# Patient Record
Sex: FEMALE | URBAN - METROPOLITAN AREA
[De-identification: names, ages, dates, MRNs, and addresses within clinical notes are randomized per-mention and may not be internally consistent; named-entity substitution may affect disease eponyms.]

---

## 2023-11-09 ENCOUNTER — TELEPHONE (OUTPATIENT)
Dept: PRIMARY CARE | Facility: CLINIC | Age: 83
End: 2023-11-09
Payer: COMMERCIAL

## 2023-11-09 NOTE — TELEPHONE ENCOUNTER
Pt was in [facility], last week treated her with ABX that caused yeast infection, asking if you could call something in for this?     Patient has follow up scheduled

## 2023-11-16 ENCOUNTER — OFFICE VISIT (OUTPATIENT)
Dept: PRIMARY CARE | Facility: CLINIC | Age: 83
End: 2023-11-16
Payer: COMMERCIAL

## 2023-11-16 VITALS
WEIGHT: 129 LBS | OXYGEN SATURATION: 98 % | RESPIRATION RATE: 16 BRPM | DIASTOLIC BLOOD PRESSURE: 76 MMHG | HEART RATE: 95 BPM | HEIGHT: 62 IN | TEMPERATURE: 97.8 F | BODY MASS INDEX: 23.74 KG/M2 | SYSTOLIC BLOOD PRESSURE: 120 MMHG

## 2023-11-16 DIAGNOSIS — B37.31 CANDIDA VAGINITIS: ICD-10-CM

## 2023-11-16 DIAGNOSIS — J45.20 INTERMITTENT ASTHMA, UNSPECIFIED ASTHMA SEVERITY, UNSPECIFIED WHETHER COMPLICATED (HHS-HCC): ICD-10-CM

## 2023-11-16 DIAGNOSIS — K57.32 DIVERTICULITIS OF LARGE INTESTINE WITHOUT PERFORATION OR ABSCESS WITHOUT BLEEDING: Primary | ICD-10-CM

## 2023-11-16 DIAGNOSIS — J30.9 ALLERGIC RHINITIS, UNSPECIFIED SEASONALITY, UNSPECIFIED TRIGGER: ICD-10-CM

## 2023-11-16 PROBLEM — I10 ESSENTIAL (PRIMARY) HYPERTENSION: Status: ACTIVE | Noted: 2023-11-16

## 2023-11-16 PROCEDURE — 99214 OFFICE O/P EST MOD 30 MIN: CPT | Performed by: INTERNAL MEDICINE

## 2023-11-16 PROCEDURE — 3078F DIAST BP <80 MM HG: CPT | Performed by: INTERNAL MEDICINE

## 2023-11-16 PROCEDURE — 1159F MED LIST DOCD IN RCRD: CPT | Performed by: INTERNAL MEDICINE

## 2023-11-16 PROCEDURE — 3074F SYST BP LT 130 MM HG: CPT | Performed by: INTERNAL MEDICINE

## 2023-11-16 RX ORDER — AZITHROMYCIN 250 MG/1
500 TABLET, FILM COATED ORAL DAILY
COMMUNITY
Start: 2019-02-10 | End: 2024-01-29 | Stop reason: WASHOUT

## 2023-11-16 RX ORDER — MONTELUKAST SODIUM 10 MG/1
10 TABLET ORAL NIGHTLY
Qty: 30 TABLET | Refills: 1 | Status: SHIPPED | OUTPATIENT
Start: 2023-11-16 | End: 2024-01-29 | Stop reason: SINTOL

## 2023-11-16 RX ORDER — AMOXICILLIN AND CLAVULANATE POTASSIUM 875; 125 MG/1; MG/1
875 TABLET, FILM COATED ORAL EVERY 12 HOURS
COMMUNITY
Start: 2023-10-28 | End: 2023-11-07

## 2023-11-16 RX ORDER — UREA 40 %
1 CREAM (GRAM) TOPICAL 2 TIMES DAILY
COMMUNITY
Start: 2012-01-06 | End: 2024-01-29 | Stop reason: WASHOUT

## 2023-11-16 RX ORDER — FLUCONAZOLE 200 MG/1
200 TABLET ORAL DAILY
Qty: 5 TABLET | Refills: 0 | Status: SHIPPED | OUTPATIENT
Start: 2023-11-16 | End: 2023-11-21

## 2023-11-16 RX ORDER — ALBUTEROL SULFATE 90 UG/1
2 AEROSOL, METERED RESPIRATORY (INHALATION) EVERY 6 HOURS PRN
COMMUNITY
Start: 2020-06-18 | End: 2023-11-16 | Stop reason: SDUPTHER

## 2023-11-16 RX ORDER — ALBUTEROL SULFATE 90 UG/1
2 AEROSOL, METERED RESPIRATORY (INHALATION) EVERY 8 HOURS PRN
Qty: 6 G | Refills: 0 | Status: SHIPPED | OUTPATIENT
Start: 2023-11-16 | End: 2024-06-03

## 2023-11-16 ASSESSMENT — ENCOUNTER SYMPTOMS
ABDOMINAL DISTENTION: 0
CONSTIPATION: 0
ABDOMINAL PAIN: 0
DIARRHEA: 0
NAUSEA: 0
VOMITING: 0

## 2023-11-16 NOTE — PROGRESS NOTES
Primary Care Physician: Migue Martínez MD    Date of Visit: 11/16/2023      Summary:   Sunday Orion is a 82 y.o. female presents     Chief Complaint:   Chief Complaint   Patient presents with    Follow-up     [facility] ED- Diverticulitis   Patient stated was given a high dose of antibiotics possibly Amoxicillin and it caused her to have a yeast infection     Ear Fullness     Patient requested Ear irrigation and prescription allergy relief          HPI:  Ear Fullness   Pertinent negatives include no abdominal pain, diarrhea or vomiting.     Hospital f/up  Pt states that she developed abd pain.  To the ed for eval.   Dx'd with diverticulitis.    Admitted x 5 days.  Ivabx.     No more pain.    No diarrhea, constipation, melena,n/v    Did develop a yeast infection post-abx    Note: she has never had a colonoscopy and she does not want one now.     Past Medical History:  No past medical history on file.     Social History:        Family History:  family history is not on file.      Allergies:  Allergies   Allergen Reactions    Loratadine-Pseudoephedrine Anaphylaxis    Macadamia Nut Oil Anaphylaxis    Pistachio Nut Anaphylaxis    Naproxen Sodium Hives and Unknown    Ibuprofen Rash       Outpatient Medications:  Current Outpatient Medications   Medication Instructions    albuterol 90 mcg/actuation inhaler 2 puffs, inhalation, Every 6 hours PRN    azithromycin (ZITHROMAX) 500 mg, oral, Daily    urea (Carmol) 40 % cream 1 Application, Topical, 2 times daily         ROS:   Review of Systems   Gastrointestinal:  Negative for abdominal distention, abdominal pain, constipation, diarrhea, nausea and vomiting.   Genitourinary:  Positive for vaginal discharge (and itching).   All other systems reviewed and are negative.       Vitals:    11/16/23 1046   BP: 120/76   BP Location: Left arm   Patient Position: Sitting   BP Cuff Size: Adult   Pulse: 95   Resp: 16   Temp: 36.6 °C (97.8 °F)   TempSrc: Temporal   SpO2: 98%   Weight: 58.5 kg  "(129 lb)   Height: 1.575 m (5' 2\")     Wt Readings from Last 1 Encounters:   11/16/23 58.5 kg (129 lb)     Body mass index is 23.59 kg/m².       Physical Exam:  Physical Exam  Constitutional:       General: She is not in acute distress.     Appearance: Normal appearance. She is normal weight. She is not ill-appearing.   HENT:      Head: Normocephalic and atraumatic.      Right Ear: Tympanic membrane and ear canal normal.      Left Ear: Tympanic membrane and ear canal normal.      Nose: Nose normal.      Mouth/Throat:      Mouth: Mucous membranes are moist.      Pharynx: Oropharynx is clear.   Cardiovascular:      Rate and Rhythm: Normal rate and regular rhythm.      Heart sounds: Normal heart sounds. No murmur heard.  Pulmonary:      Effort: Pulmonary effort is normal.      Breath sounds: Normal breath sounds.   Abdominal:      General: Abdomen is flat. Bowel sounds are normal. There is no distension.      Palpations: Abdomen is soft.      Tenderness: There is no abdominal tenderness. There is no guarding or rebound.   Neurological:      Mental Status: She is alert.               Assessment/Plan   Diagnoses and all orders for this visit:  Diverticulitis of large intestine without perforation or abscess without bleeding  Allergic rhinitis, unspecified seasonality, unspecified trigger  -     montelukast (Singulair) 10 mg tablet; Take 1 tablet (10 mg) by mouth once daily at bedtime.  Candida vaginitis  -     fluconazole (Diflucan) 200 mg tablet; Take 1 tablet (200 mg) by mouth once daily for 5 days.  Intermittant  asthma        -refilled albuterol    Orders:  No orders of the defined types were placed in this encounter.       Followup Appts:  No future appointments.        ____________________________________________________________  Migue Martínez MD  "

## 2024-01-18 ENCOUNTER — APPOINTMENT (OUTPATIENT)
Dept: PRIMARY CARE | Facility: CLINIC | Age: 84
End: 2024-01-18
Payer: COMMERCIAL

## 2024-01-29 ENCOUNTER — OFFICE VISIT (OUTPATIENT)
Dept: PRIMARY CARE | Facility: CLINIC | Age: 84
End: 2024-01-29
Payer: COMMERCIAL

## 2024-01-29 ENCOUNTER — APPOINTMENT (OUTPATIENT)
Dept: PRIMARY CARE | Facility: CLINIC | Age: 84
End: 2024-01-29
Payer: COMMERCIAL

## 2024-01-29 VITALS
SYSTOLIC BLOOD PRESSURE: 130 MMHG | HEART RATE: 90 BPM | DIASTOLIC BLOOD PRESSURE: 80 MMHG | OXYGEN SATURATION: 98 % | BODY MASS INDEX: 23.85 KG/M2 | WEIGHT: 130.4 LBS | TEMPERATURE: 97.8 F

## 2024-01-29 DIAGNOSIS — R05.9 COUGH, UNSPECIFIED TYPE: Primary | ICD-10-CM

## 2024-01-29 DIAGNOSIS — F41.9 ANXIETY: ICD-10-CM

## 2024-01-29 DIAGNOSIS — J40 BRONCHITIS: ICD-10-CM

## 2024-01-29 DIAGNOSIS — F43.10 PTSD (POST-TRAUMATIC STRESS DISORDER): ICD-10-CM

## 2024-01-29 PROCEDURE — 1159F MED LIST DOCD IN RCRD: CPT | Performed by: INTERNAL MEDICINE

## 2024-01-29 PROCEDURE — 99214 OFFICE O/P EST MOD 30 MIN: CPT | Performed by: INTERNAL MEDICINE

## 2024-01-29 PROCEDURE — 3075F SYST BP GE 130 - 139MM HG: CPT | Performed by: INTERNAL MEDICINE

## 2024-01-29 PROCEDURE — 3079F DIAST BP 80-89 MM HG: CPT | Performed by: INTERNAL MEDICINE

## 2024-01-29 RX ORDER — LEVOFLOXACIN 500 MG/1
500 TABLET, FILM COATED ORAL DAILY
Qty: 10 TABLET | Refills: 0 | Status: SHIPPED | OUTPATIENT
Start: 2024-01-29 | End: 2024-02-08

## 2024-01-29 RX ORDER — BUSPIRONE HYDROCHLORIDE 10 MG/1
10 TABLET ORAL 2 TIMES DAILY PRN
Qty: 60 TABLET | Refills: 1 | Status: SHIPPED | OUTPATIENT
Start: 2024-01-29 | End: 2024-04-29 | Stop reason: WASHOUT

## 2024-01-29 RX ORDER — BENZONATATE 200 MG/1
200 CAPSULE ORAL 3 TIMES DAILY PRN
Qty: 45 CAPSULE | Refills: 0 | Status: SHIPPED | OUTPATIENT
Start: 2024-01-29 | End: 2024-02-13

## 2024-01-29 RX ORDER — PREDNISONE 10 MG/1
TABLET ORAL
Qty: 42 TABLET | Refills: 0 | Status: SHIPPED | OUTPATIENT
Start: 2024-01-29 | End: 2024-02-05

## 2024-01-29 NOTE — PROGRESS NOTES
Primary Care Physician: Migue Martínez MD    Date of Visit: 01/29/2024     Chief Complaint:   Chief Complaint   Patient presents with    Bronchitis     1 week ER fu - last Monday.  Neg Covid & Pneumonia       Subjective:   Sunday Orion is a 83 y.o. female presents     HPI:  HPI  Cough for 2 weeks.  She has been to the ed.  Given medrol dose radha.  No abx.  Still coughing.  +mild sob when coughing.  +chest congestion  No fever.  No chills.  Covid test was neg.  Hx of  ptsd/anxiety.  States that she has been more anxious lately.      Past Medical History:  No past medical history on file.     Social History:   reports that she has been smoking cigarettes. She has never used smokeless tobacco.     Family History:  family history is not on file.      Allergies:  Allergies   Allergen Reactions    Loratadine-Pseudoephedrine Anaphylaxis    Macadamia Nut Oil Anaphylaxis    Pistachio Nut Anaphylaxis    Naproxen Sodium Hives and Unknown    Ibuprofen Rash       Outpatient Medications:  Current Outpatient Medications   Medication Instructions    albuterol 90 mcg/actuation inhaler 2 puffs, inhalation, Every 8 hours PRN         ROS:   Review of Systems   Constitutional:  Negative for chills and fever.   HENT:  Positive for congestion.    Respiratory:  Positive for cough and chest tightness.         Vitals:  /80   Pulse 90   Temp 36.6 °C (97.8 °F)   Wt 59.1 kg (130 lb 6.4 oz)   SpO2 98%   BMI 23.85 kg/m²   Wt Readings from Last 2 Encounters:   01/29/24 59.1 kg (130 lb 6.4 oz)   11/16/23 58.5 kg (129 lb)       Physical Exam:  Physical Exam  Constitutional:       Appearance: She is ill-appearing. She is not toxic-appearing.   Pulmonary:      Effort: No respiratory distress.      Breath sounds: No wheezing (but lungs sound tight.), rhonchi or rales.      Comments: Breath sounds are decreased throughout.         Assessment/Plan   Diagnoses and all orders for this visit:  Cough, unspecified type  -     benzonatate  (Tessalon) 200 mg capsule; Take 1 capsule (200 mg) by mouth 3 times a day as needed for cough for up to 15 days. Do not crush or chew.  Bronchitis  -     levoFLOXacin (Levaquin) 500 mg tablet; Take 1 tablet (500 mg) by mouth once daily for 10 days.  -     predniSONE (Deltasone) 10 mg tablet; Take 6 tablets (60 mg) by mouth once daily for 2 days, THEN 4 tablets (40 mg) once daily for 2 days, THEN 2 tablets (20 mg) once daily for 1 day, THEN 1 tablet (10 mg) once daily for 1 day, THEN 0.5 tablets (5 mg) once daily for 1 day.  -     benzonatate (Tessalon) 200 mg capsule; Take 1 capsule (200 mg) by mouth 3 times a day as needed for cough for up to 15 days. Do not crush or chew.  Anxiety  -     busPIRone (Buspar) 10 mg tablet; Take 1 tablet (10 mg) by mouth 2 times a day as needed (anxiety).  PTSD (post-traumatic stress disorder)  -     busPIRone (Buspar) 10 mg tablet; Take 1 tablet (10 mg) by mouth 2 times a day as needed (anxiety).        Orders:  No orders of the defined types were placed in this encounter.       Followup Appts:  No future appointments.        ____________________________________________________________  Migue Martínez MD

## 2024-02-19 ASSESSMENT — ENCOUNTER SYMPTOMS
FEVER: 0
COUGH: 1
CHEST TIGHTNESS: 1
CHILLS: 0

## 2024-04-11 ENCOUNTER — TELEPHONE (OUTPATIENT)
Dept: PRIMARY CARE | Facility: CLINIC | Age: 84
End: 2024-04-11
Payer: COMMERCIAL

## 2024-04-11 NOTE — TELEPHONE ENCOUNTER
PATIENT ASKING FOR A RETURN TO WORK LETTER. STATES SHE HAS BEEN OUT OF WORK SINCE SHE WAS HOSPITALIZED MARCH 30 AND WOULD LIKE TO GO BACK TO WORK NOW.  EMAIL: [email or web address]

## 2024-04-29 ENCOUNTER — OFFICE VISIT (OUTPATIENT)
Dept: PRIMARY CARE | Facility: CLINIC | Age: 84
End: 2024-04-29
Payer: COMMERCIAL

## 2024-04-29 VITALS
BODY MASS INDEX: 23.48 KG/M2 | HEIGHT: 62 IN | SYSTOLIC BLOOD PRESSURE: 139 MMHG | TEMPERATURE: 97.6 F | DIASTOLIC BLOOD PRESSURE: 81 MMHG | OXYGEN SATURATION: 100 % | WEIGHT: 127.6 LBS | HEART RATE: 76 BPM

## 2024-04-29 DIAGNOSIS — K57.32 DIVERTICULITIS OF LARGE INTESTINE WITHOUT PERFORATION OR ABSCESS WITHOUT BLEEDING: Primary | ICD-10-CM

## 2024-04-29 PROCEDURE — 3079F DIAST BP 80-89 MM HG: CPT | Performed by: INTERNAL MEDICINE

## 2024-04-29 PROCEDURE — 3075F SYST BP GE 130 - 139MM HG: CPT | Performed by: INTERNAL MEDICINE

## 2024-04-29 PROCEDURE — 1159F MED LIST DOCD IN RCRD: CPT | Performed by: INTERNAL MEDICINE

## 2024-04-29 PROCEDURE — 99213 OFFICE O/P EST LOW 20 MIN: CPT | Performed by: INTERNAL MEDICINE

## 2024-04-29 RX ORDER — OXYCODONE HYDROCHLORIDE 5 MG/1
5 TABLET ORAL EVERY 6 HOURS PRN
Qty: 20 TABLET | Refills: 0 | Status: SHIPPED | OUTPATIENT
Start: 2024-04-29 | End: 2024-06-03 | Stop reason: WASHOUT

## 2024-04-29 RX ORDER — OXYCODONE HYDROCHLORIDE 5 MG/1
5 TABLET ORAL EVERY 4 HOURS PRN
COMMUNITY
Start: 2024-04-04 | End: 2024-04-29 | Stop reason: SDUPTHER

## 2024-04-29 RX ORDER — CIPROFLOXACIN 500 MG/1
500 TABLET ORAL 2 TIMES DAILY
Qty: 20 TABLET | Refills: 0 | Status: SHIPPED | OUTPATIENT
Start: 2024-04-29 | End: 2024-06-03 | Stop reason: WASHOUT

## 2024-04-29 RX ORDER — METRONIDAZOLE 500 MG/1
500 TABLET ORAL 3 TIMES DAILY
Qty: 30 TABLET | Refills: 0 | Status: SHIPPED | OUTPATIENT
Start: 2024-04-29 | End: 2024-06-03 | Stop reason: WASHOUT

## 2024-04-29 NOTE — PROGRESS NOTES
"Primary Care Physician: Migue Martínez MD    Date of Visit: 04/29/2024     Chief Complaint:   Chief Complaint   Patient presents with    Diverticulitis     Follow up from ED admission       Subjective:   Nathanlila Sears is a 83 y.o. female presents     HPI:  HPI  Pt developed left lower abd pain.   To the ed for eval.   Dx'd with diverticulitis.    Admitted.  Ivf and pain control.    D/c'd to home after 5 day hospital stay.   Here for f/up  States that she is still having mild abd pain.  She says that she is ''afraid to eat'' she has been drinking plenty of fluids.  No fever or chills.   Dwp.  Start with soft diet such as a brat diet for 2-3days and gradually advance diet.   Past Medical History:  No past medical history on file.     Social History:   reports that she has been smoking cigarettes. She has never used smokeless tobacco. She reports that she does not drink alcohol and does not use drugs.     Family History:  family history is not on file.      Allergies:  Allergies   Allergen Reactions    Loratadine-Pseudoephedrine Anaphylaxis    Macadamia Nut Oil Anaphylaxis    Pistachio Nut Anaphylaxis    Naproxen Sodium Hives and Unknown    Ibuprofen Rash       Outpatient Medications:  Current Outpatient Medications   Medication Instructions    albuterol 90 mcg/actuation inhaler 2 puffs, inhalation, Every 8 hours PRN    busPIRone (BUSPAR) 10 mg, oral, 2 times daily PRN    oxyCODONE (ROXICODONE) 5 mg, oral, Every 4 hours PRN         ROS:   Review of Systems     Vitals:  /81 (BP Location: Left arm, Patient Position: Sitting, BP Cuff Size: Adult)   Pulse 76   Temp 36.4 °C (97.6 °F) (Temporal)   Ht 1.575 m (5' 2.01\")   Wt 57.9 kg (127 lb 9.6 oz)   SpO2 100%   BMI 23.33 kg/m²   Wt Readings from Last 2 Encounters:   04/29/24 57.9 kg (127 lb 9.6 oz)   01/29/24 59.1 kg (130 lb 6.4 oz)       Physical Exam:  Physical Exam  Vitals reviewed.   Constitutional:       Appearance: Normal appearance.      Comments: Looks " anxious and uncomfortable.  ''holding'' hands on either side of her abdomen.   HENT:      Head: Normocephalic and atraumatic.   Cardiovascular:      Rate and Rhythm: Normal rate and regular rhythm.      Heart sounds: Normal heart sounds, S1 normal and S2 normal. No murmur heard.  Pulmonary:      Effort: Pulmonary effort is normal.      Breath sounds: Normal breath sounds. No wheezing, rhonchi or rales.   Abdominal:      General: Bowel sounds are normal. There is no distension.      Palpations: Abdomen is soft. There is no mass.      Tenderness: There is abdominal tenderness. There is guarding.   Neurological:      Mental Status: She is alert and oriented to person, place, and time.          Assessment/Plan   Diagnoses and all orders for this visit:  Diverticulitis of large intestine without perforation or abscess without bleeding  -     ciprofloxacin (Cipro) 500 mg tablet; Take 1 tablet (500 mg) by mouth 2 times a day.  -     metroNIDAZOLE (Flagyl) 500 mg tablet; Take 1 tablet (500 mg) by mouth 3 times a day.  -     oxyCODONE (Roxicodone) 5 mg immediate release tablet; Take 1 tablet (5 mg) by mouth every 6 hours if needed for moderate pain (4 - 6).  Slowly advance diet.      Orders:  No orders of the defined types were placed in this encounter.       Followup Appts:  No future appointments.        ____________________________________________________________  Migue Martínez MD

## 2024-04-30 ENCOUNTER — TELEPHONE (OUTPATIENT)
Dept: PRIMARY CARE | Facility: CLINIC | Age: 84
End: 2024-04-30
Payer: COMMERCIAL

## 2024-04-30 NOTE — TELEPHONE ENCOUNTER
Patient states she gets prescription for ibuprofen from you and it was not sent into the pharmacy yesterday with her other meds.  Please send that in for her.

## 2024-05-09 ENCOUNTER — APPOINTMENT (OUTPATIENT)
Dept: PRIMARY CARE | Facility: CLINIC | Age: 84
End: 2024-05-09
Payer: COMMERCIAL

## 2024-05-20 ENCOUNTER — APPOINTMENT (OUTPATIENT)
Dept: PRIMARY CARE | Facility: CLINIC | Age: 84
End: 2024-05-20
Payer: COMMERCIAL

## 2024-06-03 ENCOUNTER — OFFICE VISIT (OUTPATIENT)
Dept: PRIMARY CARE | Facility: CLINIC | Age: 84
End: 2024-06-03
Payer: COMMERCIAL

## 2024-06-03 VITALS
HEART RATE: 79 BPM | RESPIRATION RATE: 18 BRPM | BODY MASS INDEX: 22.19 KG/M2 | SYSTOLIC BLOOD PRESSURE: 118 MMHG | DIASTOLIC BLOOD PRESSURE: 70 MMHG | HEIGHT: 62 IN | TEMPERATURE: 97.2 F | OXYGEN SATURATION: 98 % | WEIGHT: 120.6 LBS

## 2024-06-03 DIAGNOSIS — G56.03 BILATERAL CARPAL TUNNEL SYNDROME: ICD-10-CM

## 2024-06-03 DIAGNOSIS — J45.20 INTERMITTENT ASTHMA, UNSPECIFIED ASTHMA SEVERITY, UNSPECIFIED WHETHER COMPLICATED (HHS-HCC): ICD-10-CM

## 2024-06-03 DIAGNOSIS — G47.00 INSOMNIA, UNSPECIFIED TYPE: ICD-10-CM

## 2024-06-03 DIAGNOSIS — R63.4 WEIGHT LOSS: ICD-10-CM

## 2024-06-03 DIAGNOSIS — R19.7 DIARRHEA, UNSPECIFIED TYPE: Primary | ICD-10-CM

## 2024-06-03 DIAGNOSIS — J45.20 INTERMITTENT ASTHMA, UNSPECIFIED ASTHMA SEVERITY, UNSPECIFIED WHETHER COMPLICATED (HHS-HCC): Primary | ICD-10-CM

## 2024-06-03 DIAGNOSIS — R19.7 DIARRHEA, UNSPECIFIED TYPE: ICD-10-CM

## 2024-06-03 PROCEDURE — 99214 OFFICE O/P EST MOD 30 MIN: CPT | Performed by: INTERNAL MEDICINE

## 2024-06-03 PROCEDURE — 3078F DIAST BP <80 MM HG: CPT | Performed by: INTERNAL MEDICINE

## 2024-06-03 PROCEDURE — 3074F SYST BP LT 130 MM HG: CPT | Performed by: INTERNAL MEDICINE

## 2024-06-03 PROCEDURE — 1159F MED LIST DOCD IN RCRD: CPT | Performed by: INTERNAL MEDICINE

## 2024-06-03 RX ORDER — TRAZODONE HYDROCHLORIDE 50 MG/1
50 TABLET ORAL NIGHTLY PRN
Qty: 30 TABLET | Refills: 0 | Status: SHIPPED | OUTPATIENT
Start: 2024-06-03 | End: 2024-06-19 | Stop reason: SDUPTHER

## 2024-06-03 NOTE — PROGRESS NOTES
"Primary Care Physician: Migue Martínez MD    Date of Visit: 06/03/2024     Chief Complaint:   Chief Complaint   Patient presents with    Follow-up     Wants to gain weight       Subjective:   Sunday Orion is a 83 y.o. female presents     HPI:  HPI  Diarrhea.  Started with bout of diverticulitis.  Recovery has been slow.  No longer has abd pain. But, she still has intermittent diarrhea. No blood  +Weight loss post diverticulitis.    Trouble going to sleep at night     Carpal tunnel syndrome.  States that she has pain in bilat wrists.     Also says that she has had some urinary incontinence.       Past Medical History:  No past medical history on file.     Social History:   reports that she has been smoking cigarettes. She has never used smokeless tobacco. She reports that she does not drink alcohol and does not use drugs.     Family History:  family history is not on file.      Allergies:  Allergies   Allergen Reactions    Loratadine-Pseudoephedrine Anaphylaxis    Macadamia Nut Oil Anaphylaxis    Pistachio Nut Anaphylaxis    Naproxen Sodium Hives and Unknown    Ibuprofen Rash       Outpatient Medications:  Current Outpatient Medications   Medication Instructions    albuterol 90 mcg/actuation inhaler 2 puffs, inhalation, Every 8 hours PRN    traZODone (DESYREL) 50 mg, oral, Nightly PRN         ROS:   Review of Systems   Constitutional:         +wt loss with diarrhea        Vitals:  /70 (BP Location: Left arm, Patient Position: Sitting, BP Cuff Size: Adult)   Pulse 79   Temp 36.2 °C (97.2 °F) (Temporal)   Resp 18   Ht 1.575 m (5' 2.01\")   Wt 54.7 kg (120 lb 9.6 oz)   SpO2 98%   BMI 22.05 kg/m²   Wt Readings from Last 2 Encounters:   06/03/24 54.7 kg (120 lb 9.6 oz)   04/29/24 57.9 kg (127 lb 9.6 oz)       Physical Exam:  Physical Exam  Vitals reviewed.   Constitutional:       General: She is in acute distress (+mild anxiety).      Appearance: Normal appearance. She is not ill-appearing or " toxic-appearing.   HENT:      Head: Normocephalic and atraumatic.   Cardiovascular:      Rate and Rhythm: Normal rate and regular rhythm.      Heart sounds: Normal heart sounds, S1 normal and S2 normal. No murmur heard.  Pulmonary:      Effort: Pulmonary effort is normal.      Breath sounds: Normal breath sounds. No wheezing, rhonchi or rales.   Abdominal:      General: Bowel sounds are normal.      Palpations: Abdomen is soft.   Neurological:      Mental Status: She is alert and oriented to person, place, and time.   Psychiatric:         Behavior: Behavior is cooperative.               Assessment/Plan   Diagnoses and all orders for this visit:  Diarrhea, unspecified type  -     Stool Pathogen Panel, PCR  Weight loss  Insomnia, unspecified type  Bilateral carpal tunnel syndrome  -     Wrist brace cock up volar  Intermittent asthma, unspecified asthma severity, unspecified whether complicated ([facility])        Orders:  Orders Placed This Encounter   Procedures    Wrist brace cock up volar    Stool Pathogen Panel, PCR        Followup Appts:  No future appointments.        ____________________________________________________________  Migue Martínez MD

## 2024-06-07 ENCOUNTER — LAB (OUTPATIENT)
Dept: LAB | Facility: LAB | Age: 84
End: 2024-06-07
Payer: COMMERCIAL

## 2024-06-07 PROCEDURE — 87506 IADNA-DNA/RNA PROBE TQ 6-11: CPT

## 2024-06-08 LAB

## 2024-06-19 DIAGNOSIS — G47.00 INSOMNIA, UNSPECIFIED TYPE: ICD-10-CM

## 2024-06-20 DIAGNOSIS — G47.00 INSOMNIA, UNSPECIFIED TYPE: ICD-10-CM

## 2024-06-20 NOTE — TELEPHONE ENCOUNTER
Patient states insurance will only cover a 90 day supply of Trazodone they wont cover 30, can you resned for 90 day?

## 2024-06-24 RX ORDER — TRAZODONE HYDROCHLORIDE 50 MG/1
50 TABLET ORAL NIGHTLY PRN
Qty: 30 TABLET | Refills: 0 | Status: SHIPPED | OUTPATIENT
Start: 2024-06-24 | End: 2025-06-24

## 2024-06-24 RX ORDER — TRAZODONE HYDROCHLORIDE 50 MG/1
50 TABLET ORAL NIGHTLY PRN
Qty: 30 TABLET | Refills: 0 | Status: SHIPPED | OUTPATIENT
Start: 2024-06-24 | End: 2024-06-24

## 2024-06-26 NOTE — TELEPHONE ENCOUNTER
Has to be for 90 days please per patient insurance      -------------------------------------------------------------------------------------------------------------------  Pls inform pt that she will have to pay out of pocket.  It is a new medication.

## 2024-06-27 RX ORDER — TRAZODONE HYDROCHLORIDE 50 MG/1
50 TABLET ORAL NIGHTLY PRN
Qty: 90 TABLET | Refills: 0 | OUTPATIENT
Start: 2024-06-27 | End: 2025-06-27

## 2024-06-28 ENCOUNTER — TELEPHONE (OUTPATIENT)
Dept: PRIMARY CARE | Facility: CLINIC | Age: 84
End: 2024-06-28
Payer: COMMERCIAL

## 2024-06-28 PROBLEM — R19.7 DIARRHEA: Status: ACTIVE | Noted: 2024-06-28

## 2024-06-28 PROBLEM — G47.00 INSOMNIA: Status: ACTIVE | Noted: 2024-06-28

## 2024-06-28 PROBLEM — J45.20 INTERMITTENT ASTHMA (HHS-HCC): Status: ACTIVE | Noted: 2024-06-28

## 2024-06-28 PROBLEM — R63.4 WEIGHT LOSS: Status: ACTIVE | Noted: 2024-06-28

## 2024-06-28 PROBLEM — G56.03 BILATERAL CARPAL TUNNEL SYNDROME: Status: ACTIVE | Noted: 2024-06-28

## 2024-07-18 ENCOUNTER — APPOINTMENT (OUTPATIENT)
Dept: PRIMARY CARE | Facility: CLINIC | Age: 84
End: 2024-07-18
Payer: COMMERCIAL

## 2024-07-18 VITALS
BODY MASS INDEX: 21 KG/M2 | WEIGHT: 123 LBS | SYSTOLIC BLOOD PRESSURE: 126 MMHG | HEIGHT: 64 IN | HEART RATE: 73 BPM | OXYGEN SATURATION: 97 % | DIASTOLIC BLOOD PRESSURE: 68 MMHG

## 2024-07-18 DIAGNOSIS — R10.84 GENERALIZED ABDOMINAL PAIN: ICD-10-CM

## 2024-07-18 DIAGNOSIS — R73.9 HYPERGLYCEMIA: ICD-10-CM

## 2024-07-18 DIAGNOSIS — Z13.1 SCREENING FOR DIABETES MELLITUS: ICD-10-CM

## 2024-07-18 DIAGNOSIS — E55.9 VITAMIN D DEFICIENCY: ICD-10-CM

## 2024-07-18 DIAGNOSIS — D64.9 ANEMIA, UNSPECIFIED TYPE: ICD-10-CM

## 2024-07-18 DIAGNOSIS — Z13.29 SCREENING FOR THYROID DISORDER: ICD-10-CM

## 2024-07-18 DIAGNOSIS — G47.00 INSOMNIA, UNSPECIFIED TYPE: ICD-10-CM

## 2024-07-18 DIAGNOSIS — R63.4 WEIGHT LOSS: ICD-10-CM

## 2024-07-18 DIAGNOSIS — Z00.00 ENCOUNTER FOR MEDICARE ANNUAL WELLNESS EXAM: Primary | ICD-10-CM

## 2024-07-18 DIAGNOSIS — E78.2 MIXED HYPERLIPIDEMIA: ICD-10-CM

## 2024-07-18 PROCEDURE — 3074F SYST BP LT 130 MM HG: CPT | Performed by: INTERNAL MEDICINE

## 2024-07-18 PROCEDURE — 3078F DIAST BP <80 MM HG: CPT | Performed by: INTERNAL MEDICINE

## 2024-07-18 PROCEDURE — 1159F MED LIST DOCD IN RCRD: CPT | Performed by: INTERNAL MEDICINE

## 2024-07-18 PROCEDURE — 99214 OFFICE O/P EST MOD 30 MIN: CPT | Performed by: INTERNAL MEDICINE

## 2024-07-18 RX ORDER — TRAZODONE HYDROCHLORIDE 50 MG/1
50 TABLET ORAL NIGHTLY PRN
Qty: 90 TABLET | Refills: 1 | Status: SHIPPED | OUTPATIENT
Start: 2024-07-18 | End: 2025-07-18

## 2024-07-18 NOTE — PROGRESS NOTES
"Primary Care Physician: Migue Martínez MD    Date of Visit: 07/18/2024     Chief Complaint:   Chief Complaint   Patient presents with    Follow-up       Subjective:   Sunday Orion is a 83 y.o. female presents     HPI:  HPI  INSOMNIA.  Finally got script to try the trazodone.  States that it has helped her sleep.  STATES THAT SHE IS SOMETIMES GROGGY IN THE MORNING, BUT, SHE GOES TO BED LATE AND WAKES UP EARLY TO PROVIDE TRANSPORTATION TO SOMEONE.  DIVERIC--ABD PAIN HAS FINALLY RESOLVED COMPLETELY.  FOODS DO NO BOTHER HER WHEN SHE EATS.   WT LOSS POST DIVERTICULITIS  DOWN  POUNDS.  NOW GAINING WT BACK--     Past Medical History:  No past medical history on file.     Social History:   reports that she has been smoking cigarettes. She has never used smokeless tobacco. She reports that she does not drink alcohol and does not use drugs.     Family History:  family history is not on file.      Allergies:  Allergies   Allergen Reactions    Loratadine-Pseudoephedrine Anaphylaxis    Macadamia Nut Oil Anaphylaxis    Pistachio Nut Anaphylaxis    Naproxen Sodium Hives and Unknown    Ibuprofen Rash       Outpatient Medications:  Current Outpatient Medications   Medication Instructions    albuterol 90 mcg/actuation inhaler 2 puffs, inhalation, Every 8 hours PRN    traZODone (DESYREL) 50 mg, oral, Nightly PRN         ROS:   Review of Systems     Vitals:  /68 (BP Location: Left arm, Patient Position: Sitting, BP Cuff Size: Adult)   Pulse 73   Ht 1.626 m (5' 4\")   Wt 55.8 kg (123 lb)   SpO2 97%   BMI 21.11 kg/m²   Wt Readings from Last 2 Encounters:   07/18/24 55.8 kg (123 lb)   06/03/24 54.7 kg (120 lb 9.6 oz)       Physical Exam:  Physical Exam  Vitals reviewed.   Constitutional:       Appearance: Normal appearance.   HENT:      Head: Normocephalic and atraumatic.   Cardiovascular:      Rate and Rhythm: Normal rate and regular rhythm.      Heart sounds: Normal heart sounds, S1 normal and S2 normal. No murmur " heard.  Pulmonary:      Effort: Pulmonary effort is normal.      Breath sounds: Normal breath sounds. No wheezing, rhonchi or rales.   Abdominal:      General: Bowel sounds are normal.      Palpations: Abdomen is soft.   Neurological:      Mental Status: She is alert and oriented to person, place, and time.               Assessment/Plan   Diagnoses and all orders for this visit:  Encounter for Medicare annual wellness exam  -     CBC and Auto Differential; Future  -     Comprehensive Metabolic Panel; Future  -     Lipid Panel; Future  -     Urinalysis with Reflex Microscopic; Future  -     Vitamin D 25-Hydroxy,Total (for eval of Vitamin D levels); Future  -     TSH with reflex to Free T4 if abnormal; Future  -     Hemoglobin A1C; Future  Generalized abdominal pain  Weight loss  Anemia, unspecified type  -     CBC and Auto Differential; Future  Mixed hyperlipidemia  -     Lipid Panel; Future  Vitamin D deficiency  -     Vitamin D 25-Hydroxy,Total (for eval of Vitamin D levels); Future  Screening for thyroid disorder  -     TSH with reflex to Free T4 if abnormal; Future  Screening for diabetes mellitus  -     Hemoglobin A1C; Future  Hyperglycemia  -     Hemoglobin A1C; Future  Insomnia, unspecified type  -     traZODone (Desyrel) 50 mg tablet; Take 1 tablet (50 mg) by mouth as needed at bedtime for sleep.    Labs ordered, but she is not getting done today.     Orders:  Orders Placed This Encounter   Procedures    CBC and Auto Differential    Comprehensive Metabolic Panel    Lipid Panel    Urinalysis with Reflex Microscopic    Vitamin D 25-Hydroxy,Total (for eval of Vitamin D levels)    TSH with reflex to Free T4 if abnormal    Hemoglobin A1C        Followup Appts:  No future appointments.        ____________________________________________________________  Migue Martínez MD

## 2024-07-18 NOTE — PROGRESS NOTES
Primary Care Physician: Migue Martínez MD    Date of Visit: 07/18/2024     Chief Complaint:   No chief complaint on file.      Subjective:   Sunday Orion is a 83 y.o. female presents     HPI:  HPI    ANXIETY    Past Medical History:  No past medical history on file.     Social History:   reports that she has been smoking cigarettes. She has never used smokeless tobacco. She reports that she does not drink alcohol and does not use drugs.     Family History:  family history is not on file.      Allergies:  Allergies   Allergen Reactions   • Loratadine-Pseudoephedrine Anaphylaxis   • Macadamia Nut Oil Anaphylaxis   • Pistachio Nut Anaphylaxis   • Naproxen Sodium Hives and Unknown   • Ibuprofen Rash       Outpatient Medications:  Current Outpatient Medications   Medication Instructions   • albuterol 90 mcg/actuation inhaler 2 puffs, inhalation, Every 8 hours PRN   • traZODone (DESYREL) 50 mg, oral, Nightly PRN         ROS:   Review of Systems     Vitals:  There were no vitals taken for this visit.  Wt Readings from Last 2 Encounters:   07/18/24 55.8 kg (123 lb)   06/03/24 54.7 kg (120 lb 9.6 oz)       Physical Exam:  Physical Exam          Assessment/Plan         Orders:  No orders of the defined types were placed in this encounter.       Followup Appts:  No future appointments.        ____________________________________________________________  Migue Martínez MD

## 2024-07-31 ENCOUNTER — TELEPHONE (OUTPATIENT)
Dept: PRIMARY CARE | Facility: CLINIC | Age: 84
End: 2024-07-31
Payer: COMMERCIAL

## 2024-07-31 NOTE — TELEPHONE ENCOUNTER
Pt asking if you will send in prednisone 10mg for her. Having a rash. Says you have called in before.

## 2024-08-22 ENCOUNTER — OFFICE VISIT (OUTPATIENT)
Dept: PRIMARY CARE | Facility: CLINIC | Age: 84
End: 2024-08-22
Payer: COMMERCIAL

## 2024-08-22 ENCOUNTER — TELEPHONE (OUTPATIENT)
Dept: PRIMARY CARE | Facility: CLINIC | Age: 84
End: 2024-08-22
Payer: COMMERCIAL

## 2024-08-22 VITALS
SYSTOLIC BLOOD PRESSURE: 132 MMHG | BODY MASS INDEX: 20.32 KG/M2 | DIASTOLIC BLOOD PRESSURE: 78 MMHG | HEART RATE: 77 BPM | OXYGEN SATURATION: 98 % | HEIGHT: 64 IN | WEIGHT: 119 LBS

## 2024-08-22 VITALS
SYSTOLIC BLOOD PRESSURE: 132 MMHG | HEART RATE: 93 BPM | OXYGEN SATURATION: 100 % | WEIGHT: 119 LBS | HEIGHT: 64 IN | DIASTOLIC BLOOD PRESSURE: 78 MMHG | BODY MASS INDEX: 20.32 KG/M2

## 2024-08-22 DIAGNOSIS — F41.9 ANXIETY: ICD-10-CM

## 2024-08-22 DIAGNOSIS — K59.00 CONSTIPATION, UNSPECIFIED CONSTIPATION TYPE: ICD-10-CM

## 2024-08-22 DIAGNOSIS — R10.32 LEFT LOWER QUADRANT PAIN: ICD-10-CM

## 2024-08-22 DIAGNOSIS — K57.92 DIVERTICULITIS: Primary | ICD-10-CM

## 2024-08-22 PROCEDURE — 3078F DIAST BP <80 MM HG: CPT | Performed by: INTERNAL MEDICINE

## 2024-08-22 PROCEDURE — 3075F SYST BP GE 130 - 139MM HG: CPT | Performed by: INTERNAL MEDICINE

## 2024-08-22 PROCEDURE — 99213 OFFICE O/P EST LOW 20 MIN: CPT | Performed by: INTERNAL MEDICINE

## 2024-08-22 PROCEDURE — 1159F MED LIST DOCD IN RCRD: CPT | Performed by: INTERNAL MEDICINE

## 2024-08-22 RX ORDER — HYDROXYZINE HYDROCHLORIDE 25 MG/1
25 TABLET, FILM COATED ORAL 2 TIMES DAILY PRN
Qty: 60 TABLET | Refills: 1 | Status: SHIPPED | OUTPATIENT
Start: 2024-08-22

## 2024-08-22 RX ORDER — METRONIDAZOLE 500 MG/1
500 TABLET ORAL 3 TIMES DAILY
Qty: 30 TABLET | Refills: 0 | Status: SHIPPED | OUTPATIENT
Start: 2024-08-22

## 2024-08-22 RX ORDER — IBUPROFEN 600 MG/1
600 TABLET ORAL EVERY 6 HOURS PRN
Qty: 120 TABLET | Refills: 0 | Status: SHIPPED | OUTPATIENT
Start: 2024-08-22

## 2024-08-22 RX ORDER — POLYETHYLENE GLYCOL 3350 17 G/17G
17 POWDER, FOR SOLUTION ORAL DAILY PRN
Qty: 595 G | Refills: 1 | Status: SHIPPED | OUTPATIENT
Start: 2024-08-22

## 2024-08-22 RX ORDER — CIPROFLOXACIN 500 MG/1
500 TABLET ORAL 2 TIMES DAILY
Qty: 20 TABLET | Refills: 0 | Status: SHIPPED | OUTPATIENT
Start: 2024-08-22

## 2024-08-22 ASSESSMENT — ENCOUNTER SYMPTOMS
ABDOMINAL PAIN: 1
BLOOD IN STOOL: 0
CONSTIPATION: 1
DIARRHEA: 0

## 2024-08-22 NOTE — PROGRESS NOTES
"Subjective   Patient ID: Nathan Sears is a 83 y.o. female who presents for Diverticulitis.    HPI     Review of Systems    Objective   /78 (BP Location: Left arm, Patient Position: Sitting, BP Cuff Size: Adult)   Pulse 93   Ht 1.626 m (5' 4\")   Wt 54 kg (119 lb)   SpO2 100%   BMI 20.43 kg/m²     Physical Exam    Assessment/Plan   {Assess/PlanSmartLinks:44033}       "

## 2024-08-22 NOTE — PROGRESS NOTES
"Primary Care Physician: Migue Martínez MD    Date of Visit: 08/22/2024     Chief Complaint:   Chief Complaint   Patient presents with    Diverticulitis         HPI:  HPI    Subjective:   Sunday Orion is a 83 y.o. female presents   Pt states that 2-3 days ago, she developed pain in the llq similar to the pain she experienced when she had diverticulitis  States that she has had chills and fever (did not measure)  No bm in the past couple of days.  She has been taking a stool softener.       Allergies:  Allergies   Allergen Reactions    Loratadine-Pseudoephedrine Anaphylaxis    Macadamia Nut Oil Anaphylaxis    Pistachio Nut Anaphylaxis    Naproxen Sodium Hives and Unknown    Ibuprofen Rash       Outpatient Medications:  Current Outpatient Medications   Medication Instructions    albuterol 90 mcg/actuation inhaler 2 puffs, inhalation, Every 8 hours PRN    traZODone (DESYREL) 50 mg, oral, Nightly PRN       ROS:   Review of Systems   Gastrointestinal:  Positive for abdominal pain and constipation. Negative for blood in stool and diarrhea.        Vitals:  /78 (BP Location: Left arm, Patient Position: Sitting, BP Cuff Size: Adult)   Pulse 77   Ht 1.626 m (5' 4\")   Wt 54 kg (119 lb)   SpO2 98%   BMI 20.43 kg/m²   Wt Readings from Last 3 Encounters:   08/22/24 54 kg (119 lb)   08/22/24 54 kg (119 lb)   07/18/24 55.8 kg (123 lb)     BP Readings from Last 3 Encounters:   08/22/24 132/78   08/22/24 132/78   07/18/24 126/68        Physical Exam:  Physical Exam  Constitutional:       General: She is in acute distress.      Appearance: She is ill-appearing. She is not toxic-appearing or diaphoretic.   Abdominal:      General: Abdomen is flat. Bowel sounds are normal. There is no distension.      Palpations: Abdomen is soft.      Tenderness: There is abdominal tenderness in the left lower quadrant.          Comments: There is tenderness of the llq that rads to the left flank, but does not reach her back.       "     Assessment/Plan   Diagnoses and all orders for this visit:  Diverticulitis  -     polyethylene glycol (Miralax) 17 gram/dose powder; Take 17 g by mouth once daily as needed (*).  -     ibuprofen 600 mg tablet; Take 1 tablet (600 mg) by mouth every 6 hours if needed for mild pain (1 - 3) or moderate pain (4 - 6) (pain).  -     metroNIDAZOLE (Flagyl) 500 mg tablet; Take 1 tablet (500 mg) by mouth 3 times a day.  -     ciprofloxacin (Cipro) 500 mg tablet; Take 1 tablet (500 mg) by mouth 2 times a day.  Left lower quadrant pain  Anxiety  -     hydrOXYzine HCL (Atarax) 25 mg tablet; Take 1 tablet (25 mg) by mouth 2 times a day as needed for anxiety.  Constipation, unspecified constipation type  -     polyethylene glycol (Miralax) 17 gram/dose powder; Take 17 g by mouth once daily as needed (*).  Liquid--broths/soups--diet for the next few days.    Orders:  No orders of the defined types were placed in this encounter.       Followup Appts:  No future appointments.        ____________________________________________________________  Migue Martínez MD

## 2024-08-27 ENCOUNTER — TELEPHONE (OUTPATIENT)
Dept: PRIMARY CARE | Facility: CLINIC | Age: 84
End: 2024-08-27
Payer: COMMERCIAL

## 2024-08-27 NOTE — TELEPHONE ENCOUNTER
The Cipro and Flagyl are making her sick. She is throwing up and has nausea. The pain in her stomach has subsided. She doesn't want to end back up in hospital. She wanted a virtual. [phone] land line     Per Dr. Martínez stop the meds.  P

## 2024-09-03 ENCOUNTER — TELEPHONE (OUTPATIENT)
Dept: PRIMARY CARE | Facility: CLINIC | Age: 84
End: 2024-09-03
Payer: COMMERCIAL

## 2024-12-12 ENCOUNTER — TELEPHONE (OUTPATIENT)
Dept: PRIMARY CARE | Facility: CLINIC | Age: 84
End: 2024-12-12
Payer: COMMERCIAL

## 2024-12-12 DIAGNOSIS — K57.92 DIVERTICULITIS: ICD-10-CM

## 2024-12-12 RX ORDER — METRONIDAZOLE 500 MG/1
500 TABLET ORAL 3 TIMES DAILY
Qty: 30 TABLET | Refills: 0 | Status: SHIPPED | OUTPATIENT
Start: 2024-12-12

## 2024-12-12 NOTE — TELEPHONE ENCOUNTER
Patient called states she is having a diverticulitis flare up and in a lot of pain. Wondering if you could send in amoxicillin for her?

## 2025-01-31 ENCOUNTER — TELEPHONE (OUTPATIENT)
Dept: PRIMARY CARE | Facility: CLINIC | Age: 85
End: 2025-01-31
Payer: COMMERCIAL

## 2025-01-31 DIAGNOSIS — K57.92 DIVERTICULITIS: ICD-10-CM

## 2025-01-31 DIAGNOSIS — J45.20 INTERMITTENT ASTHMA, UNSPECIFIED ASTHMA SEVERITY, UNSPECIFIED WHETHER COMPLICATED (HHS-HCC): ICD-10-CM

## 2025-01-31 RX ORDER — METRONIDAZOLE 500 MG/1
500 TABLET ORAL 3 TIMES DAILY
Qty: 30 TABLET | Refills: 0 | Status: SHIPPED | OUTPATIENT
Start: 2025-01-31

## 2025-01-31 RX ORDER — IBUPROFEN 600 MG/1
600 TABLET ORAL EVERY 6 HOURS PRN
Qty: 120 TABLET | Refills: 0 | Status: SHIPPED | OUTPATIENT
Start: 2025-01-31

## 2025-01-31 RX ORDER — ALBUTEROL SULFATE 90 UG/1
2 INHALANT RESPIRATORY (INHALATION) EVERY 8 HOURS PRN
Qty: 18 G | Refills: 0 | Status: SHIPPED | OUTPATIENT
Start: 2025-01-31 | End: 2025-08-19

## 2025-01-31 NOTE — TELEPHONE ENCOUNTER
Per Dr Martínez  I have sent abx's in for her.  not amoxicillin, b/c that will not work.  also, I will not send abx's again without a visit.    Sunday advised

## 2025-01-31 NOTE — TELEPHONE ENCOUNTER
Patient called in requesting the following medications to go to [facility] because she is having a diverticulitis flare up and wants to avoid having to go to the hospital.  Amoxicillin 875-125mg  Ibuprofen 800mg  And also needing a refill on her albuterol inhaler

## 2025-02-07 ENCOUNTER — TELEPHONE (OUTPATIENT)
Dept: PRIMARY CARE | Facility: CLINIC | Age: 85
End: 2025-02-07
Payer: COMMERCIAL

## 2025-02-07 NOTE — TELEPHONE ENCOUNTER
[facility] [phone] she is covering for her  RASHID DAYAMI [phone] is calling in wanting to let you know that patient is currently at [facility] 02/04/2025 no discharge plan as of right now  she is there with colitis .

## 2025-03-07 ENCOUNTER — APPOINTMENT (OUTPATIENT)
Dept: PRIMARY CARE | Facility: CLINIC | Age: 85
End: 2025-03-07
Payer: COMMERCIAL

## 2025-03-21 ENCOUNTER — PATIENT OUTREACH (OUTPATIENT)
Dept: CARE COORDINATION | Facility: CLINIC | Age: 85
End: 2025-03-21
Payer: COMMERCIAL

## 2025-03-21 ENCOUNTER — DOCUMENTATION (OUTPATIENT)
Dept: CARE COORDINATION | Facility: CLINIC | Age: 85
End: 2025-03-21
Payer: COMMERCIAL

## 2025-03-21 SDOH — ECONOMIC STABILITY: GENERAL: WOULD YOU LIKE HELP WITH ANY OF THE FOLLOWING NEEDS?: I DONT NEED HELP WITH ANY OF THESE

## 2025-03-21 NOTE — PROGRESS NOTES
Outreach call to patient to support a smooth transition of care from recent discharge from SNF.  Patient is status post Colostomy at [facility] and verbalized feeling very weak.  A Home Health RN visited the patient today.    Spoke with patient, reviewed discharge medications, discharge instructions, assessed social needs, and provided education on importance of follow-up appointment with provider. Will assist patient in obtaining a follow up visit with her PCP, Dr. Migue Martínez, [phone].    Will continue to monitor through transition period.    Kelly Ibarra RN, Care Manager  [facility], [facility]  [phone]

## 2025-03-21 NOTE — SIGNIFICANT EVENT
03/21/25 1626   Social Determinants of Health- Help Requested   Would you like help with any of the following needs? I dont need help with any of these

## 2025-03-21 NOTE — PROGRESS NOTES
Referral received for care management services following discharge [facility]. Will outreach to patient to assess needs and provide support.

## 2025-03-24 ENCOUNTER — PATIENT OUTREACH (OUTPATIENT)
Dept: CARE COORDINATION | Facility: CLINIC | Age: 85
End: 2025-03-24
Payer: COMMERCIAL

## 2025-03-24 NOTE — PROGRESS NOTES
Spoke with patient's PCP office to obtain a follow up appointment for the patient and was told that the appointment is scheduled for 03/25/2025 at 2;20 p.m.  Relayed this info to the patient, who asked me to please cancel this appointment for her because she has no transportation for the appointment at this time but will reschedule when she is recovering  better.    Will continue to follow.    Kelly Ibarra RN, Care Manager  [facility], [facility]  [phone]

## 2025-03-25 ENCOUNTER — APPOINTMENT (OUTPATIENT)
Dept: PRIMARY CARE | Facility: CLINIC | Age: 85
End: 2025-03-25
Payer: COMMERCIAL

## 2025-03-26 ENCOUNTER — TELEPHONE (OUTPATIENT)
Dept: PRIMARY CARE | Facility: CLINIC | Age: 85
End: 2025-03-26
Payer: COMMERCIAL

## 2025-03-26 NOTE — TELEPHONE ENCOUNTER
[facility] nurse calling. She went out to pt home to do evaluation. Pt seems safe, is doing well independently, low risk of falls. Just still a little weak from recent hospital stay.   Pt declined home therapy at this time. If she changes her mind in the future she can call emily to start POC.

## 2025-04-02 ENCOUNTER — APPOINTMENT (OUTPATIENT)
Dept: PRIMARY CARE | Facility: CLINIC | Age: 85
End: 2025-04-02
Payer: COMMERCIAL

## 2025-04-08 ENCOUNTER — PATIENT OUTREACH (OUTPATIENT)
Dept: CARE COORDINATION | Facility: CLINIC | Age: 85
End: 2025-04-08
Payer: COMMERCIAL

## 2025-04-08 NOTE — PROGRESS NOTES
Outreach call to patient following up on a virtual appointment with primary care provider.   Will continue to follow.      Kelly Ibarra RN, Care Manager  [facility], [facility] [place] [facility]  [phone]

## 2025-04-21 ENCOUNTER — PATIENT OUTREACH (OUTPATIENT)
Dept: CARE COORDINATION | Facility: CLINIC | Age: 85
End: 2025-04-21
Payer: COMMERCIAL

## 2025-04-21 NOTE — PROGRESS NOTES
Outreach call to patient to check in 30 days after hospital discharge to support smooth transition of care.  Patient with no additional needs noted. No additional outreach needed at this time.     Kelly Ibarra RN, Care Manager  [facility], \[facility] [place] [facility]\"" Care  [phone]

## 2025-05-21 ENCOUNTER — APPOINTMENT (OUTPATIENT)
Dept: PRIMARY CARE | Facility: CLINIC | Age: 85
End: 2025-05-21
Payer: COMMERCIAL

## 2025-05-21 VITALS
HEART RATE: 81 BPM | TEMPERATURE: 97.1 F | BODY MASS INDEX: 19.31 KG/M2 | WEIGHT: 109 LBS | RESPIRATION RATE: 16 BRPM | DIASTOLIC BLOOD PRESSURE: 70 MMHG | SYSTOLIC BLOOD PRESSURE: 118 MMHG | HEIGHT: 63 IN

## 2025-05-21 DIAGNOSIS — L29.9 EAR ITCHING: ICD-10-CM

## 2025-05-21 DIAGNOSIS — M79.604 RIGHT LEG PAIN: Primary | ICD-10-CM

## 2025-05-21 DIAGNOSIS — L29.9 ITCHING: ICD-10-CM

## 2025-05-21 RX ORDER — CIPROFLOXACIN AND DEXAMETHASONE 3; 1 MG/ML; MG/ML
4 SUSPENSION/ DROPS AURICULAR (OTIC) 2 TIMES DAILY
Qty: 7.5 ML | Refills: 2 | Status: SHIPPED | OUTPATIENT
Start: 2025-05-21 | End: 2025-05-28

## 2025-05-21 RX ORDER — HYDROCORTISONE 25 MG/G
CREAM TOPICAL 2 TIMES DAILY PRN
Qty: 30 G | Refills: 0 | Status: SHIPPED | OUTPATIENT
Start: 2025-05-21 | End: 2026-05-21

## 2025-05-21 RX ORDER — LEVOCETIRIZINE DIHYDROCHLORIDE 5 MG/1
5 TABLET, FILM COATED ORAL EVERY EVENING
Qty: 30 TABLET | Refills: 11 | Status: SHIPPED | OUTPATIENT
Start: 2025-05-21 | End: 2026-05-21

## 2025-05-21 NOTE — PROGRESS NOTES
"Subjective   Patient ID: Nathan Sears is a 84 y.o. female who presents for Leg Pain (Right upper thigh pain ), Rash (Possible rash on back- itching all the time ), and Ear Fullness.  HPI  Patient wants to discuss 3 things   1) R leg since she had surgery , no abdominal pain , she has a hemicolectomy , she has a stabbing pain   It is constant , hurst when she lays down   Sleeps on the R side. It is between 7 bad.   2) back itching chornic  , since she had surgery   3) ear fullness and itching  Review of Systems    Medical History[1]    Surgical History[2]   Social History[3]   Family History[4]    MEDICATIONS AND ALLERGIES:    ALLERGIES Loratadine-pseudoephedrine, Macadamia nut oil, Pistachio nut, Naproxen sodium, and Ibuprofen    MEDICATIONS   Medications Ordered Prior to Encounter[5]           Objective   Visit Vitals  /70   Pulse 81   Temp 36.2 °C (97.1 °F) (Temporal)   Resp 16   Ht 1.6 m (5' 3\")   Wt 49.4 kg (109 lb)   BMI 19.31 kg/m²   Smoking Status Every Day   BSA 1.48 m²          1/29/2024     3:59 PM 4/29/2024     2:57 PM 6/3/2024     2:24 PM 7/18/2024     1:06 PM 8/22/2024     4:03 PM 8/22/2024     4:18 PM 5/21/2025    12:54 PM   Vitals   Systolic 130 139 118 126 132 132 118   Diastolic 80 81 70 68 78 78 70   BP Location  Left arm Left arm Left arm Left arm Left arm    Heart Rate 90 76 79 73 93 77 81   Temp 36.6 °C (97.8 °F) 36.4 °C (97.6 °F) 36.2 °C (97.2 °F)    36.2 °C (97.1 °F)   Resp   18    16   Height  1.575 m (5' 2.01\") 1.575 m (5' 2.01\") 1.626 m (5' 4\") 1.626 m (5' 4\") 1.626 m (5' 4\") 1.6 m (5' 3\")   Weight (lb) 130.4 127.6 120.6 123 119 119 109   BMI 23.85 kg/m2 23.33 kg/m2 22.05 kg/m2 21.11 kg/m2 20.43 kg/m2 20.43 kg/m2 19.31 kg/m2   BSA (m2) 1.61 m2 1.59 m2 1.55 m2 1.59 m2 1.56 m2 1.56 m2 1.48 m2   Visit Report Report Report Report Report Report    Report Report    Report Report     Physical Exam    No rashes on the back   Ears minimal cerumen , clear TM , sliglhty erythematous TM "     Assessment & Plan  Right leg pain  Chornic   Stabbing   Not improving   Exacerbated by mvt    Will order Priscillarys   Refer for PT  Orders:    XR hip right with pelvis when performed 2 or 3 views; Future    XR femur right 2+ views; Future    Referral to Physical Therapy; Future    levocetirizine (Xyzal) 5 mg tablet; Take 1 tablet (5 mg) by mouth once daily in the evening.    hydrocortisone 2.5 % cream; Apply topically 2 times a day as needed for irritation or rash. Use it for 10 days    Ear itching  Minimal cerumen   Will treat with ciprodex for possible eczema or otits externa.   Orders:    ciprofloxacin-dexamethasone (CiproDEX) otic suspension; Administer 4 drops into each ear 2 times a day for 7 days.    Itching  On raches noted   Will treat with hydrocortisone topical in case of dry rash   Will add xyzal for bed time                      [1] History reviewed. No pertinent past medical history.  [2] History reviewed. No pertinent surgical history.  [3]   Social History  Socioeconomic History    Marital status: Single   Tobacco Use    Smoking status: Every Day     Types: Cigarettes    Smokeless tobacco: Never   Substance and Sexual Activity    Alcohol use: Never    Drug use: Never     Social Drivers of Health     Financial Resource Strain: Low Risk  (10/25/2023)    Received from [facility]    Overall Financial Resource Strain (CARDIA)     Difficulty of Paying Living Expenses: Not hard at all   Food Insecurity: No Food Insecurity (2/19/2025)    Received from [facility]    Hunger Vital Sign     Worried About Running Out of Food in the Last Year: Never true     Ran Out of Food in the Last Year: Never true   Transportation Needs: No Transportation Needs (2/19/2025)    Received from [facility]    PRAPARE - Transportation     Lack of Transportation (Medical): No     Lack of Transportation (Non-Medical): No   Housing Stability: Low Risk  (2/19/2025)    Received from [facility]    Housing Stability  Vital Sign     Unable to Pay for Housing in the Last Year: No     Number of Times Moved in the Last Year: 0     Homeless in the Last Year: No   [4] No family history on file.  [5]   Current Outpatient Medications on File Prior to Visit   Medication Sig Dispense Refill    ibuprofen 600 mg tablet Take 1 tablet (600 mg) by mouth every 6 hours if needed for mild pain (1 - 3) or moderate pain (4 - 6) (pain). 120 tablet 0    albuterol 90 mcg/actuation inhaler Inhale 2 puffs every 8 hours if needed for shortness of breath or wheezing. 18 g 0    ciprofloxacin (Cipro) 500 mg tablet Take 1 tablet (500 mg) by mouth 2 times a day. (Patient not taking: Reported on 5/21/2025) 20 tablet 0    hydrOXYzine HCL (Atarax) 25 mg tablet Take 1 tablet (25 mg) by mouth 2 times a day as needed for anxiety. (Patient not taking: Reported on 5/21/2025) 60 tablet 1    metroNIDAZOLE (Flagyl) 500 mg tablet Take 1 tablet (500 mg) by mouth 3 times a day. (Patient not taking: Reported on 5/21/2025) 30 tablet 0    polyethylene glycol (Miralax) 17 gram/dose powder Take 17 g by mouth once daily as needed (*). 595 g 1    traZODone (Desyrel) 50 mg tablet Take 1 tablet (50 mg) by mouth as needed at bedtime for sleep. (Patient not taking: Reported on 5/21/2025) 90 tablet 1     No current facility-administered medications on file prior to visit.

## 2025-06-25 ENCOUNTER — APPOINTMENT (OUTPATIENT)
Dept: PRIMARY CARE | Facility: CLINIC | Age: 85
End: 2025-06-25
Payer: COMMERCIAL

## 2025-08-08 DIAGNOSIS — K57.20 DIVERTICULITIS OF LARGE INTESTINE WITH PERFORATION, UNSPECIFIED BLEEDING STATUS: Primary | ICD-10-CM

## 2025-08-27 ENCOUNTER — APPOINTMENT (OUTPATIENT)
Dept: PRIMARY CARE | Facility: CLINIC | Age: 85
End: 2025-08-27
Payer: COMMERCIAL